# Patient Record
Sex: FEMALE | Race: BLACK OR AFRICAN AMERICAN
[De-identification: names, ages, dates, MRNs, and addresses within clinical notes are randomized per-mention and may not be internally consistent; named-entity substitution may affect disease eponyms.]

---

## 2017-02-26 ENCOUNTER — HOSPITAL ENCOUNTER (EMERGENCY)
Dept: HOSPITAL 17 - NEPE | Age: 22
Discharge: HOME | End: 2017-02-26
Payer: MEDICAID

## 2017-02-26 VITALS — WEIGHT: 143.3 LBS | HEIGHT: 64 IN | BODY MASS INDEX: 24.46 KG/M2

## 2017-02-26 VITALS
SYSTOLIC BLOOD PRESSURE: 116 MMHG | DIASTOLIC BLOOD PRESSURE: 76 MMHG | RESPIRATION RATE: 25 BRPM | OXYGEN SATURATION: 98 % | HEART RATE: 133 BPM

## 2017-02-26 VITALS
HEART RATE: 112 BPM | DIASTOLIC BLOOD PRESSURE: 76 MMHG | SYSTOLIC BLOOD PRESSURE: 116 MMHG | RESPIRATION RATE: 28 BRPM | OXYGEN SATURATION: 100 %

## 2017-02-26 VITALS — SYSTOLIC BLOOD PRESSURE: 114 MMHG | DIASTOLIC BLOOD PRESSURE: 72 MMHG

## 2017-02-26 DIAGNOSIS — O9A.212: Primary | ICD-10-CM
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
## 2017-02-26 DIAGNOSIS — R10.30: ICD-10-CM

## 2017-02-26 DIAGNOSIS — Z3A.16: ICD-10-CM

## 2017-02-26 DIAGNOSIS — S00.83XA: ICD-10-CM

## 2017-02-26 PROCEDURE — 99284 EMERGENCY DEPT VISIT MOD MDM: CPT
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
## 2017-06-07 ENCOUNTER — HOSPITAL ENCOUNTER (EMERGENCY)
Dept: HOSPITAL 17 - HOBED | Age: 22
Discharge: HOME | End: 2017-06-07
Payer: MEDICAID

## 2017-06-07 DIAGNOSIS — Z3A.31: ICD-10-CM

## 2017-06-07 DIAGNOSIS — O46.93: Primary | ICD-10-CM

## 2017-06-07 LAB
COLOR UR: YELLOW
COMMENT (UR): (no result)
CULTURE IF INDICATED: (no result)
GLUCOSE UR STRIP-MCNC: (no result) MG/DL
HGB UR QL STRIP: (no result)
KETONES UR STRIP-MCNC: 40 MG/DL
MUCOUS THREADS #/AREA URNS LPF: (no result) /LPF
NITRITE UR QL STRIP: (no result)
SP GR UR STRIP: 1.02 (ref 1–1.03)
SQUAMOUS #/AREA URNS HPF: 8 /HPF (ref 0–5)

## 2017-06-07 PROCEDURE — 99284 EMERGENCY DEPT VISIT MOD MDM: CPT

## 2017-06-07 PROCEDURE — 81001 URINALYSIS AUTO W/SCOPE: CPT

## 2017-06-07 PROCEDURE — 86900 BLOOD TYPING SEROLOGIC ABO: CPT

## 2017-06-07 PROCEDURE — 86901 BLOOD TYPING SEROLOGIC RH(D): CPT

## 2017-06-07 NOTE — PD
HPI


Chief Complaint


Vaginal bleeding


Travel History


International Travel<30 Days:  No


Contact w/Intl Traveler<30Days:  No


Known Affected Area:  No





History of Present Illness


HPI


 at 31w 5d, presents with c/o vaginal bleeding.  Reports bleeding as light 

brown, no clots.  Denies abdominal/pelvic pain.  Denies LOF/contractions.


Good FM.  [facility].  Denies problems this 

pregnancy.


Para:  0


:  1





History


Past Medical History


Medical History:  Denies Significant Hx





Obstetric History


Obstetric History


FT 





Past Surgical History


Surgical History:  No Previous Surgery





Family History


Family History:  Negative





Social History


Alcohol Use:  No


Tobacco Use:  No


Substance Abuse:  No





Allergies-Medications


(Allergen,Severity, Reaction):  


Coded Allergies:  


     *MDRO Multi-Drug Resistant Organism (Unverified  Adverse Reaction, Unknown

, 17)


 MRSA - urine 2015


Home Meds


Reported Medications


Prenatal Vit-Iron Carbonyl (Prenatal Plus Iron 29-1 mg)1 Tab Tab1 Tab PO DAILY  

#30 TAB  Ref 0


   17





Review of Systems


Except as stated in HPI:  all other systems reviewed are Neg





Physical Exam


AFVSS  /65


Narrative


GENERAL: Well-nourished, well-developed patient.


SKIN: Warm and dry.


HEAD: Normocephalic and atraumatic.


EYES: No scleral icterus. No injection or drainage. 


ENT: No nasal drainage noted. Mucous membranes pink. Airway patent.


NECK: Supple, trachea midline. No JVD.


CARDIOVASCULAR: Regular rate and rhythm without murmurs, gallops, or rubs. 


RESPIRATORY: Breath sounds equal bilaterally. No accessory muscle use.


BREASTS: Bilateral exam showed no masses , no retractions, no nipple discharge.


ABDOMEN/GI: Abdomen soft, non-tender, bowel sounds present, no rebound, no 

guarding 


   Gravid to [-] weeks size


   Fundal Height: [-]


GENITOURINARY: SSE- no blood/bleeding noted, brown scant discharge noted


   External Genitalia: intact and normal in appearance


   BUS glands: [-]


   Cervix: [-]


   Dilatation: [0]          


   Effacement: [0]          


   Station: [-3]  


   Presentation: [-]        


   Membranes: [intact or ruptured]


   Uterine Contractions: [rare, 2 over one hour]


FHT's: 


   Category: [1]   


   Baseline: [150]   


   Reactive: [y]   


   Variability: [moderate]  


   Decels: [none]  


EXTREMITIES: No cyanosis or edema.


BACK: Nontender without obvious deformity. No CVA tenderness.


NEUROLOGICAL: Awake and alert. Motor and sensory grossly within normal limits. 

Five out of 5 muscle strength in all muscle groups. Normal speech.





Data


Data


Orders


AFVSS  /65


Labs


O+, UA- small LE, negative nitrites





MDM


Interpretation(s)


IUP at 31w 5d with vaginal spotting.


Narrative Course / MDM


Will monitor.  Will obtain T&S.  Awaiting UA.


Close f/u with OB provider upon discharge.  Pelvic rest.  All questions 

answered.


Diagnosis


Diagnosis:  


 Primary Impression:  


 31 weeks gestation of pregnancy


 Additional Impression:  


 Vaginal bleeding during pregnancy, antepartum


Disposition:   DISCHARGE HOME


Condition:  Stable








Fara Alegria MD 2017 17:08

## 2018-03-20 ENCOUNTER — HOSPITAL ENCOUNTER (EMERGENCY)
Dept: HOSPITAL 17 - NED | Age: 23
Discharge: LEFT BEFORE DISCHARGE | End: 2018-03-20
Payer: SELF-PAY

## 2018-03-20 VITALS
OXYGEN SATURATION: 98 % | SYSTOLIC BLOOD PRESSURE: 112 MMHG | RESPIRATION RATE: 16 BRPM | TEMPERATURE: 100.9 F | HEART RATE: 115 BPM | DIASTOLIC BLOOD PRESSURE: 78 MMHG

## 2018-03-20 DIAGNOSIS — J04.0: Primary | ICD-10-CM

## 2018-03-20 PROCEDURE — 99281 EMR DPT VST MAYX REQ PHY/QHP: CPT

## 2018-03-20 NOTE — PD
HPI


Chief Complaint:  Cold / Flu Symptoms


Time Seen by Provider:  18:12


Travel History


International Travel<30 days:  No


Contact w/Intl Traveler<30days:  No


Traveled to known affect area:  No





History of Present Illness


HPI


23-year-old female presents emergency department for evaluation of sore throat 

since yesterday with white spots on the pharynx noticed today.  She has been 

tired and sleepy most of the day.  She feels febrile.  No nausea vomiting.  No 

chest pain or tightness.  No other symptoms to report.





PFSH


Past Medical History


Medical History:  Denies Significant Hx


Developmental Delay:  No


Diminished Hearing:  No


Immunizations Current:  Yes


Pregnant?:  Not Pregnant


LMP:  2/10/2018


:  1


Para:  1


Miscarriage:  0


:  0





Social History


Alcohol Use:  No


Tobacco Use:  No


Substance Use:  No





Allergies-Medications


(Allergen,Severity, Reaction):  


Coded Allergies:  


     *MDRO Multi-Drug Resistant Organism (Unverified  Adverse Reaction, Unknown

, 17)


 MRSA - urine 2015


Reported Meds & Prescriptions





Reported Meds & Active Scripts


Active


Reported


Prenatal Plus Iron 29-1 mg (Prenatal Vit-Iron Carbonyl) 1 Tab Tab 1 Tab PO DAILY








Review of Systems


Except as stated in HPI:  all other systems reviewed are Neg





Physical Exam


Narrative


Well-nourished female patient.  Appears nontoxic.  She is febrile.  She is a 

tachycardic rhythm.  She has even respirations.  She moves all extremities.  

She speaks to me clearly.





Data


Data


Last Documented VS





Vital Signs








  Date Time  Temp Pulse Resp B/P (MAP) Pulse Ox O2 Delivery O2 Flow Rate FiO2


 


3/20/18 18:12 100.9 115 16 112/78 (89) 98   











MDM


Medical Decision Making


Medical Screen Exam Complete:  Yes


Emergency Medical Condition:  Yes


Medical Record Reviewed:  Yes


Differential Diagnosis


Pharyngitis viral versus strep versus tonsillitis versus influenza


Narrative Course


23-year-old female presents emergency department for evaluation sore throat.  

Patient appears nontoxic.  She is tachycardic and febrile here in triage.  

Workup is initiated.  Prior to bed placement, patient chooses to leave.  AMA: 

The risks of leaving against medical advice without further evaluation 

treatment were discussed with the patient.  These risks include cardiac 

dysfunction, cardiac dysrhythmia, possible heart attack, possible stroke or 

death.  The patient indicated understanding of these risks and appeared to have 

the capacity to make this decision.





Diagnosis





 Primary Impression:  


 Sore throat and laryngitis


Disposition:  07 AGAINST MEDICAL ADVICE


Condition:  Stable











Fabby Alex Mar 20, 2018 22:11